# Patient Record
Sex: FEMALE | Race: BLACK OR AFRICAN AMERICAN
[De-identification: names, ages, dates, MRNs, and addresses within clinical notes are randomized per-mention and may not be internally consistent; named-entity substitution may affect disease eponyms.]

---

## 2020-09-30 ENCOUNTER — HOSPITAL ENCOUNTER (EMERGENCY)
Dept: HOSPITAL 62 - ER | Age: 50
Discharge: HOME | End: 2020-09-30
Payer: COMMERCIAL

## 2020-09-30 VITALS — SYSTOLIC BLOOD PRESSURE: 132 MMHG | DIASTOLIC BLOOD PRESSURE: 83 MMHG
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
## 2020-09-30 DIAGNOSIS — S61.012A: Primary | ICD-10-CM

## 2020-09-30 PROCEDURE — 73140 X-RAY EXAM OF FINGER(S): CPT

## 2020-09-30 PROCEDURE — 12001 RPR S/N/AX/GEN/TRNK 2.5CM/<: CPT

## 2020-09-30 PROCEDURE — 99283 EMERGENCY DEPT VISIT LOW MDM: CPT

## 2020-09-30 NOTE — ER DOCUMENT REPORT
ED General





- General


Chief Complaint: Finger Injury


Stated Complaint: WC/LEFT HAND INJURY


Time Seen by Provider: 09/30/20 12:47


Mode of Arrival: Ambulatory


Information source: Patient


Notes: 





49-year-old -American female coming in today with left thumb laceration. 

Apparently she came down over the tip of her left thumb with a  at 

school.  She is up-to-date on tetanus.


TRAVEL OUTSIDE OF THE [place] IN LAST 30 DAYS: No





- Related Data


Allergies/Adverse Reactions: 


                                        





No Known Allergies Allergy (Verified 09/30/20 12:46)


   











Past Medical History





- General


Information source: Patient





- Social History


Smoking Status: Never Smoker


Frequency of alcohol use: Occasional


Family History: Reviewed & Not Pertinent





Review of Systems





- Review of Systems


Notes: 


Constitutional:  No fevers. No chills.





EENT: No eye redness. No eye pain. No ear pain. No sore throat.





Cardiovascular:  No chest pain. No palpitations.





Respiratory: No cough. No shortness of breath. No respiratory distress.





Gastrointestinal: No abdominal pain. No nausea, vomiting, or diarrhea.





Genitourinary: Atraumatic. No lesions. No pain. No discharge.





Musculoskeletal: Atraumatic. No swelling. No deformities.





Skin: No rash or lesions.  Positive left thumb laceration





Lymphatic: No swollen lymph nodes.





Neurologic: No headache. No syncope.





Psychiatric: No suicidal or homicidal ideation.





Physical Exam





- Vital signs


Vitals: 





                                        











Temp Pulse Resp BP Pulse Ox


 


 98.3 F   74   16   132/83 H  74 L


 


 09/30/20 12:40  09/30/20 12:40  09/30/20 12:40  09/30/20 12:40  09/30/20 12:40














- Notes


Notes: 


General: Well-developed, well-nourished. In no acute distress. Non-toxic 

appearing.





Cardiac: Well-perfused. Regular rate and rhythm. No murmurs, rubs, or gallops. 





Pulmonary: No respiratory distress. No cyanosis. Bilateral lung fiels are clear 

to auscultation.





Abdominal: Non-distended. Non-rigid. Bowels sounds are present in all four 

quadrants. No guarding or rebound.





HEENT: Head is atraumatic. Conjunctivae not reddened. No tearing. PERRL. EOMI. 

Orbits atraumatic. No periorbital swelling or erythema. Oropharynx is without 

erythema, swelling, or exudates.





Neck: Supple. No adenopathy. No meningismus.





Dermatologic: Warm with good turgor. No rash. Atraumatic.





Chest: Atraumatic. No chest wall tenderness to palpation.





Musculoskeletal: 2 cm laceration over the tip of the left thumb involving the 

distal fingernail.  No active bleeding.  No bony deformity.  Neurovascularly 

intact





Genitourinary: Examination deferred





Neurologic: No gross neurologic deficits.





Psychiatric: Normal mood. 











Course





- Re-evaluation


Re-evalutation: 





09/30/20 15:14


X-rays negative.  Tetanus is up-to-date.  See laceration note for details of the

repair.





- Vital Signs


Vital signs: 





                                        











Temp Pulse Resp BP Pulse Ox


 


 98.3 F   74   16   132/83 H  74 L


 


 09/30/20 12:40  09/30/20 12:40  09/30/20 12:40  09/30/20 12:40  09/30/20 12:40














- Diagnostic Test


Radiology reviewed: Reports reviewed





Procedures





- Laceration/Wound Repair


  ** Left Thumb


Time completed: 15:15


Wound length (cm): 2


Wound's Depth, Shape: Linear, Nail-avulsed - Partially avulsed


Laceration pre-procedure: Sterile PPE donned, Sterile drapes applied, Shur-Clens

applied


Anesthetic type: 1% Lidocaine


Volume Anesthetic (mLs): 5 - Digital block


Wound explored: Clean


Wound Repaired With: Sutures


Suture Size/Type: 4:0, Ethilon


Number of Sutures: 5


Layer Closure?: No


Post-procedure wound care: Sterile dressing applied


Post-procedure NV exam normal: Yes


Complications: No





Discharge





- Discharge


Clinical Impression: 


Finger laceration


Qualifiers:


 Encounter type: initial encounter Finger: thumb Damage to nail status: with 

damage Foreign body presence: without foreign body Laterality: left Qualified 

Code(s): S61.112A - Laceration without foreign body of left thumb with damage to

nail, initial encounter





Condition: Good


Disposition: HOME, SELF-CARE


Instructions:  Antibiotic Ointment Protection ([facility]), Laceration Care ([facility]), Soap

Cleansing ([facility])


Additional Instructions: 


Take ibuprofen or Aleve as needed for pain.  If pain is severe and not 

alleviated with over-the-counter medications, he may take Norco as directed.  

Again, do not drive any vehicles or operate heavy machinery while under the 

influence of the prescription medication.  Follow-up with the [employer] 

provider to have sutures removed in 10 days.

## 2020-09-30 NOTE — ER DOCUMENT REPORT
ED Medical Screen (RME)





- General


Chief Complaint: Finger Injury


Stated Complaint: WC/LEFT HAND INJURY


Time Seen by Provider: 09/30/20 12:47


Mode of Arrival: Ambulatory


Information source: Patient


Notes: 





HPI;-year-old female presents emergency room with a laceration through her left 

thumb and thumbnail that happened at work around 10 AM this morning.  Patient 

states she was using a  at work when she accidentally cut her 

finger.  Bleeding is controlled.  Unknown last tetanus shot.  Patient is right-

handed.





PE: Alert and oriented x3.  Mild distress noted.  Lungs: Clear to auscultation 

without rales, rhonchi, wheezes.  Heart: Regular rate rhythm without murmurs, 

rubs, gallops.  There is a laceration that extends from the lateral aspect of 

the thumb into the nailbed.  Bleeding is controlled.  Positive left radial 

pulse.  Capillary refill less than 3 seconds.








I have greeted and performed a rapid initial assessment of this patient.  A 

comprehensive ED assessment and evaluation of the patient, analysis of test 

results and completion of the medical decision making process will be conducted 

by additional ED providers.  I have specifically instructed the patient or famil

y members with the patient to immediately return to any nursing staff should 

anything change in the patient's condition or with their chief complaint.


TRAVEL OUTSIDE OF THE [place] IN LAST 30 DAYS: No





- Related Data


Allergies/Adverse Reactions: 


                                        





No Known Allergies Allergy (Verified 09/30/20 12:46)


   











Past Medical History





- Social History


Frequency of alcohol use: Occasional





Physical Exam





- Vital signs


Vitals: 





                                        











Temp Pulse Resp BP Pulse Ox


 


 98.3 F   74   16   132/83 H  74 L


 


 09/30/20 12:40  09/30/20 12:40  09/30/20 12:40  09/30/20 12:40  09/30/20 12:40














Course





- Vital Signs


Vital signs: 





                                        











Temp Pulse Resp BP Pulse Ox


 


 98.3 F   74   16   132/83 H  74 L


 


 09/30/20 12:40  09/30/20 12:40  09/30/20 12:40  09/30/20 12:40  09/30/20 12:40

## 2020-09-30 NOTE — RADIOLOGY REPORT (SQ)
EXAM DESCRIPTION:  FINGER LEFT



IMAGES COMPLETED DATE/TIME:  9/30/2020 1:11 pm



REASON FOR STUDY:  left thumb injury



COMPARISON:  None.



NUMBER OF VIEWS:  Three views.



TECHNIQUE:  AP, lateral, and oblique images acquired of the left thumb.



LIMITATIONS:  None.



FINDINGS:  MINERALIZATION: Normal.

BONES: No acute fracture or dislocation.  No worrisome bone lesions.

SOFT TISSUES: No soft tissue swelling.  No foreign body.

OTHER: No other significant finding.



IMPRESSION:  NO RADIOGRAPHIC EVIDENCE OF ACUTE INJURY.



TECHNICAL DOCUMENTATION:  JOB ID:  [id number]

 2011 [employer]- All Rights Reserved



Reading location - IP/workstation name: [email or web address]